# Patient Record
Sex: FEMALE | Race: WHITE | NOT HISPANIC OR LATINO | Employment: FULL TIME | URBAN - METROPOLITAN AREA
[De-identification: names, ages, dates, MRNs, and addresses within clinical notes are randomized per-mention and may not be internally consistent; named-entity substitution may affect disease eponyms.]

---

## 2023-08-11 ENCOUNTER — OFFICE VISIT (OUTPATIENT)
Dept: FAMILY MEDICINE | Facility: CLINIC | Age: 43
End: 2023-08-11
Payer: COMMERCIAL

## 2023-08-11 VITALS
HEART RATE: 84 BPM | SYSTOLIC BLOOD PRESSURE: 119 MMHG | OXYGEN SATURATION: 96 % | DIASTOLIC BLOOD PRESSURE: 84 MMHG | TEMPERATURE: 97.5 F

## 2023-08-11 DIAGNOSIS — J01.90 ACUTE NON-RECURRENT SINUSITIS, UNSPECIFIED LOCATION: Primary | ICD-10-CM

## 2023-08-11 PROCEDURE — 99203 OFFICE O/P NEW LOW 30 MIN: CPT

## 2023-08-11 RX ORDER — FLUTICASONE PROPIONATE 50 MCG
1 SPRAY, SUSPENSION (ML) NASAL DAILY
Qty: 16 G | Refills: 0 | Status: SHIPPED | OUTPATIENT
Start: 2023-08-11

## 2023-08-11 RX ORDER — PREDNISONE 20 MG/1
20 TABLET ORAL 2 TIMES DAILY
Qty: 10 TABLET | Refills: 0 | Status: SHIPPED | OUTPATIENT
Start: 2023-08-11 | End: 2023-08-16

## 2023-08-11 RX ORDER — VITAMIN B COMPLEX
1 TABLET ORAL DAILY
COMMUNITY

## 2023-08-11 RX ORDER — PHENTERMINE HYDROCHLORIDE 37.5 MG/1
0.5 TABLET ORAL DAILY
COMMUNITY
Start: 2023-07-11

## 2023-08-11 NOTE — PROGRESS NOTES
Assessment & Plan     Acute non-recurrent sinusitis, unspecified location    - amoxicillin-clavulanate (AUGMENTIN) 875-125 MG tablet; Take 1 tablet by mouth 2 times daily for 7 days  - predniSONE (DELTASONE) 20 MG tablet; Take 1 tablet (20 mg) by mouth 2 times daily for 5 days  - fluticasone (FLONASE) 50 MCG/ACT nasal spray; Spray 1 spray into both nostrils daily      12 minutes spent by me on the date of the encounter doing chart review, patient visit, and documentation        See Patient Instructions    Return in about 1 week (around 8/18/2023), or if symptoms worsen or fail to improve.    [facility]  [facility]    Shira Mccracken is a 43 year old, presenting for the following health issues:  Urgent Care and Sinus Problem (Possible sinus infection. Right side is really drain and congested)      HPI     Presents with possible sinus infection.  History of deviated septum, frequent sinus infections that she uses Flonase for daily, but forgot to bring it with her when she came to [place].  No fever or cough.  Has had swelling over the right eye.  Sinus pressure is located in the right side of her face.  ENT back home has given her prednisone to use, and she 120 mg tablet left that she cut into quarters and took 5 mg this morning.  Has been having thick green drainage from the nostrils, has a headache and is fatigued.  COVID test prior to leaving for [place] was negative      Review of Systems   Constitutional, HEENT, cardiovascular, pulmonary, gi and gu systems are negative, except as otherwise noted.      Objective    /84   Pulse 84   Temp 97.5  F (36.4  C) (Tympanic)   SpO2 96%   There is no height or weight on file to calculate BMI.  Physical Exam   GENERAL: healthy, alert and no distress  EYES: eyelids-mild swelling right eyelid with watery drainage.  HENT: normal cephalic/atraumatic, ear canals and TM's normal, nasal  mucosa edematous , rhinorrhea clear and green, oropharynx clear, and oral mucous membranes moist  NECK: bilateral anterior cervical adenopathy, sinus tenderness over the right frontal and maxillary sinuses  RESP: lungs clear to auscultation - no rales, rhonchi or wheezes  CV: regular rates and rhythm, normal S1 S2, no S3 or S4, and no murmur, click or rub